# Patient Record
Sex: FEMALE | Race: WHITE
[De-identification: names, ages, dates, MRNs, and addresses within clinical notes are randomized per-mention and may not be internally consistent; named-entity substitution may affect disease eponyms.]

---

## 2017-12-06 ENCOUNTER — HOSPITAL ENCOUNTER (OUTPATIENT)
Dept: HOSPITAL 17 - HRAD | Age: 57
Discharge: HOME | End: 2017-12-06
Attending: INTERNAL MEDICINE
Payer: MEDICAID

## 2017-12-06 VITALS
DIASTOLIC BLOOD PRESSURE: 77 MMHG | OXYGEN SATURATION: 96 % | RESPIRATION RATE: 18 BRPM | HEART RATE: 67 BPM | SYSTOLIC BLOOD PRESSURE: 120 MMHG

## 2017-12-06 VITALS
HEART RATE: 70 BPM | OXYGEN SATURATION: 97 % | SYSTOLIC BLOOD PRESSURE: 135 MMHG | RESPIRATION RATE: 20 BRPM | DIASTOLIC BLOOD PRESSURE: 80 MMHG

## 2017-12-06 VITALS
RESPIRATION RATE: 18 BRPM | DIASTOLIC BLOOD PRESSURE: 86 MMHG | SYSTOLIC BLOOD PRESSURE: 123 MMHG | OXYGEN SATURATION: 96 % | HEART RATE: 71 BPM

## 2017-12-06 VITALS
TEMPERATURE: 99.5 F | RESPIRATION RATE: 20 BRPM | HEART RATE: 67 BPM | DIASTOLIC BLOOD PRESSURE: 89 MMHG | OXYGEN SATURATION: 98 % | SYSTOLIC BLOOD PRESSURE: 141 MMHG

## 2017-12-06 VITALS — HEIGHT: 70 IN | WEIGHT: 135.36 LBS | BODY MASS INDEX: 19.38 KG/M2

## 2017-12-06 VITALS
RESPIRATION RATE: 18 BRPM | OXYGEN SATURATION: 95 % | HEART RATE: 72 BPM | TEMPERATURE: 98.3 F | DIASTOLIC BLOOD PRESSURE: 77 MMHG | SYSTOLIC BLOOD PRESSURE: 116 MMHG

## 2017-12-06 VITALS
OXYGEN SATURATION: 97 % | RESPIRATION RATE: 20 BRPM | DIASTOLIC BLOOD PRESSURE: 88 MMHG | HEART RATE: 68 BPM | SYSTOLIC BLOOD PRESSURE: 130 MMHG

## 2017-12-06 DIAGNOSIS — D70.9: ICD-10-CM

## 2017-12-06 DIAGNOSIS — J44.9: ICD-10-CM

## 2017-12-06 DIAGNOSIS — C92.00: Primary | ICD-10-CM

## 2017-12-06 DIAGNOSIS — D53.9: ICD-10-CM

## 2017-12-06 LAB
APTT BLD: 28.8 SEC (ref 24.3–30.1)
BASOPHILS NFR BLD AUTO: 1 % (ref 0–2)
BONE MARROW PROCESSING: (no result)
EOSINOPHIL NFR BLD: 4 % (ref 0–4)
ERYTHROCYTE [DISTWIDTH] IN BLOOD BY AUTOMATED COUNT: 19.5 % (ref 11.6–17.2)
HCT VFR BLD CALC: 27.7 % (ref 35–46)
HEMO FLAGS: (no result)
INR PPP: 1.1 RATIO
IRON STAIN: (no result)
JENNER GIEMSA STAIN: (no result)
MCH RBC QN AUTO: 35.2 PG (ref 27–34)
MCHC RBC AUTO-ENTMCNC: 33.1 % (ref 32–36)
MCV RBC AUTO: 106.6 FL (ref 80–100)
NEUTS BAND # BLD MANUAL: 0.4 TH/MM3 (ref 1.8–7.7)
NEUTS BAND NFR BLD: 4 % (ref 0–6)
NEUTS SEG NFR BLD MANUAL: 15 % (ref 16–70)
OVALOCYTES BLD QL SMEAR: (no result)
PLAT MORPH BLD: NORMAL
PLATELET # BLD: 198 TH/MM3 (ref 150–450)
PLATELET BLD QL SMEAR: NORMAL
PROTHROMBIN TIME: 10.8 SEC (ref 9.8–11.6)
RBC # BLD AUTO: 2.6 MIL/MM3 (ref 4–5.3)
SCAN/DIFF: (no result)
WBC # BLD AUTO: 2 TH/MM3 (ref 4–11)
WBC DIFF SAMPLE: 100

## 2017-12-06 PROCEDURE — 77012 CT SCAN FOR NEEDLE BIOPSY: CPT

## 2017-12-06 PROCEDURE — 88311 DECALCIFY TISSUE: CPT

## 2017-12-06 PROCEDURE — 38221 DX BONE MARROW BIOPSIES: CPT

## 2017-12-06 PROCEDURE — 85610 PROTHROMBIN TIME: CPT

## 2017-12-06 PROCEDURE — C1830 POWER BONE MARROW BX NEEDLE: HCPCS

## 2017-12-06 PROCEDURE — 99153 MOD SED SAME PHYS/QHP EA: CPT

## 2017-12-06 PROCEDURE — G0364 BONE MARROW ASPIRATE &BIOPSY: HCPCS

## 2017-12-06 PROCEDURE — 85007 BL SMEAR W/DIFF WBC COUNT: CPT

## 2017-12-06 PROCEDURE — 88313 SPECIAL STAINS GROUP 2: CPT

## 2017-12-06 PROCEDURE — 88305 TISSUE EXAM BY PATHOLOGIST: CPT

## 2017-12-06 PROCEDURE — 99152 MOD SED SAME PHYS/QHP 5/>YRS: CPT

## 2017-12-06 PROCEDURE — 85097 BONE MARROW INTERPRETATION: CPT

## 2017-12-06 PROCEDURE — 85027 COMPLETE CBC AUTOMATED: CPT

## 2017-12-06 PROCEDURE — 85730 THROMBOPLASTIN TIME PARTIAL: CPT

## 2017-12-06 NOTE — RADRPT
EXAM DATE/TIME:  12/06/2017 14:22 

 

[facility] COMPARISON:     

No previous studies available for comparison.

 

 

INDICATIONS :      

Myelodysplastic syndrome, acute myeloid leukemia, neutropenia. 

                     

 

 

 

SEDATION TIME:       

40 minutes

 

 

BIOPSY SITE:       

Right ilium.

                     

 

MEDICATION(S):

1.) 3 mg midazolam (Versed) IV  

2.) 150 mcg fentanyl (Sublimaze) IV  

 

 

DEVICE(S):

1.) 11 gauge Bone marrow biopsy needle 

                     

 

MEDICAL HISTORY :     

Chronic obstructive pulmonary disease. Renal calculi. Stroke Myelodysplastic syndrome. 

 

SURGICAL HISTORY :     

Hysterectomy.   

 

ENCOUNTER:     

Initial

 

ACUITY:     

1 day

 

PAIN SCORE:     

0/10

 

LOCATION:     

Right pelvis

                     

A total of one core specimen(s) were obtained and sent to the laboratory for pathologic evaluation.

 

 

PROCEDURE:

1.   CT guided bone marrow biopsy.

 

 

Prior to the procedure informed consent was obtained.  Any appropriate prior imaging studies were rev
iewed.  Using automated exposure control and adjustment of the mA and/or kV according to patient size
, radiation dose was kept as low as reasonably achievable to obtain optimal diagnostic quality images
.  DICOM format image data is available electronically for review and comparison.  

 

The site was prepped in a sterile fashion.  Full sterile technique was used, including cap, mask, hira
rile gloves and gown and a large sterile sheet.  Hand hygiene and 2% chlorhexidine and/or betadine/al
cohol prep was utilized per protocol for cutaneous antisepsis.  The skin and subcutaneous tissues wer
e infiltrated with local anesthetic solution.

 

With CT guidance the previously identified target was localized. Biopsy was performed using the presc
ribed needle as above.  Following biopsy marrow aspiration was performed with repeat puncture. Adequa
te hemostasis was obtained with compression at the puncture site.

 

Follow-up CT scan reveals no hemorrhage.

 

Conscious sedation was performed with the prescribed dosages and duration as above in the presence of
 an independent trained radiology nurse to assist in the monitoring of the patient.  EKG and oximetry
 remained stable throughout the procedure. The patient tolerated the procedure well and there were no
 complications.  The patient was sent to Radiology Outpatient Unit in stable condition.

 

CONCLUSION:     

1.  Uncomplicated CT guided bone marrow aspirate.

2.  Uncomplicated CT guided bone marrow biopsy.

 

 

 

 Dusty Whittington MD on December 06, 2017 at 15:55           

Board Certified Radiologist.

 This report was verified electronically.

## 2017-12-06 NOTE — PD.RAD
Post CT Procedure Prog Note


Pre Procedure Diagnosis:  


(1) Anemia


(2) AML (acute myeloblastic leukemia)


Post Procedure Diagnosis:  


(1) AML (acute myeloblastic leukemia)


(2) Anemia


Procedure Date:


Dec 6, 2017


Supervising Radiologist:


Dusty Whittington


Anesthesia:  Local, Analgesia, Conscious Sedation


Plan of Activity


Patient to Unit:  [facility]


Patient Condition:  Good


See PACS Report for procedural detail/treatment





Biopsy


Imaging Guidance:  CT


Side:  Right


Biopsy Procedure:  Bone Marrow


Specimen:  Core Biopsy, Fine Needle Aspirate


Fluid Description:  Dusty Singh MD Dec 6, 2017 14:55

## 2018-04-12 ENCOUNTER — HOSPITAL ENCOUNTER (OUTPATIENT)
Dept: HOSPITAL 17 - PHED | Age: 58
Setting detail: OBSERVATION
LOS: 1 days | Discharge: HOME | End: 2018-04-13
Attending: HOSPITALIST | Admitting: HOSPITALIST
Payer: MEDICAID

## 2018-04-12 VITALS
OXYGEN SATURATION: 93 % | HEART RATE: 72 BPM | SYSTOLIC BLOOD PRESSURE: 89 MMHG | RESPIRATION RATE: 16 BRPM | DIASTOLIC BLOOD PRESSURE: 59 MMHG

## 2018-04-12 VITALS
DIASTOLIC BLOOD PRESSURE: 60 MMHG | SYSTOLIC BLOOD PRESSURE: 109 MMHG | OXYGEN SATURATION: 97 % | RESPIRATION RATE: 16 BRPM | HEART RATE: 84 BPM | TEMPERATURE: 99.5 F

## 2018-04-12 VITALS
OXYGEN SATURATION: 96 % | HEART RATE: 77 BPM | TEMPERATURE: 98.9 F | SYSTOLIC BLOOD PRESSURE: 97 MMHG | DIASTOLIC BLOOD PRESSURE: 71 MMHG | RESPIRATION RATE: 12 BRPM

## 2018-04-12 VITALS
TEMPERATURE: 98.8 F | RESPIRATION RATE: 12 BRPM | SYSTOLIC BLOOD PRESSURE: 97 MMHG | OXYGEN SATURATION: 97 % | DIASTOLIC BLOOD PRESSURE: 78 MMHG | HEART RATE: 77 BPM

## 2018-04-12 VITALS — HEIGHT: 70 IN | BODY MASS INDEX: 19.73 KG/M2 | WEIGHT: 137.79 LBS

## 2018-04-12 VITALS
HEART RATE: 74 BPM | DIASTOLIC BLOOD PRESSURE: 66 MMHG | SYSTOLIC BLOOD PRESSURE: 91 MMHG | RESPIRATION RATE: 18 BRPM | OXYGEN SATURATION: 93 %

## 2018-04-12 VITALS — DIASTOLIC BLOOD PRESSURE: 51 MMHG | SYSTOLIC BLOOD PRESSURE: 97 MMHG

## 2018-04-12 DIAGNOSIS — I25.2: ICD-10-CM

## 2018-04-12 DIAGNOSIS — F41.9: ICD-10-CM

## 2018-04-12 DIAGNOSIS — G62.9: ICD-10-CM

## 2018-04-12 DIAGNOSIS — D70.9: ICD-10-CM

## 2018-04-12 DIAGNOSIS — J44.9: ICD-10-CM

## 2018-04-12 DIAGNOSIS — Z79.82: ICD-10-CM

## 2018-04-12 DIAGNOSIS — M19.90: ICD-10-CM

## 2018-04-12 DIAGNOSIS — I10: ICD-10-CM

## 2018-04-12 DIAGNOSIS — G89.29: ICD-10-CM

## 2018-04-12 DIAGNOSIS — Z86.73: ICD-10-CM

## 2018-04-12 DIAGNOSIS — D64.89: Primary | ICD-10-CM

## 2018-04-12 DIAGNOSIS — Z85.72: ICD-10-CM

## 2018-04-12 DIAGNOSIS — D46.9: ICD-10-CM

## 2018-04-12 DIAGNOSIS — R53.83: ICD-10-CM

## 2018-04-12 DIAGNOSIS — R06.02: ICD-10-CM

## 2018-04-12 DIAGNOSIS — Z79.899: ICD-10-CM

## 2018-04-12 DIAGNOSIS — F17.210: ICD-10-CM

## 2018-04-12 LAB
BUN SERPL-MCNC: 15 MG/DL (ref 7–18)
CALCIUM SERPL-MCNC: 8.2 MG/DL (ref 8.5–10.1)
CHLORIDE SERPL-SCNC: 111 MEQ/L (ref 98–107)
CREAT SERPL-MCNC: 0.77 MG/DL (ref 0.5–1)
ERYTHROCYTE [DISTWIDTH] IN BLOOD BY AUTOMATED COUNT: 30.9 % (ref 11.6–17.2)
GFR SERPLBLD BASED ON 1.73 SQ M-ARVRAT: 77 ML/MIN (ref 89–?)
GLUCOSE SERPL-MCNC: 87 MG/DL (ref 74–106)
HCO3 BLD-SCNC: 22.3 MEQ/L (ref 21–32)
HCT VFR BLD CALC: 15.8 % (ref 35–46)
HGB BLD-MCNC: 5.3 GM/DL (ref 11.6–15.3)
INR PPP: 1.2 RATIO
LYMPHOCYTES: 78 % (ref 9–44)
MCH RBC QN AUTO: 36 PG (ref 27–34)
MCHC RBC AUTO-ENTMCNC: 33.8 % (ref 32–36)
MCV RBC AUTO: 106.3 FL (ref 80–100)
MONOCYTES: 9 % (ref 0–8)
NEUTS BAND # BLD MANUAL: 0.2 TH/MM3 (ref 1.8–7.7)
NEUTS SEG NFR BLD MANUAL: 13 % (ref 16–70)
PLATELET # BLD: 132 TH/MM3 (ref 150–450)
PMV BLD AUTO: 8.4 FL (ref 7–11)
PROTHROMBIN TIME: 12.1 SEC (ref 9.8–11.6)
RBC # BLD AUTO: 1.48 MIL/MM3 (ref 4–5.3)
SODIUM SERPL-SCNC: 140 MEQ/L (ref 136–145)
TARGETS BLD QL SMEAR: (no result)
WBC # BLD AUTO: 1.8 TH/MM3 (ref 4–11)

## 2018-04-12 PROCEDURE — 86922 COMPATIBILITY TEST ANTIGLOB: CPT

## 2018-04-12 PROCEDURE — G0378 HOSPITAL OBSERVATION PER HR: HCPCS

## 2018-04-12 PROCEDURE — 96361 HYDRATE IV INFUSION ADD-ON: CPT

## 2018-04-12 PROCEDURE — 96360 HYDRATION IV INFUSION INIT: CPT

## 2018-04-12 PROCEDURE — 85007 BL SMEAR W/DIFF WBC COUNT: CPT

## 2018-04-12 PROCEDURE — 85610 PROTHROMBIN TIME: CPT

## 2018-04-12 PROCEDURE — 86901 BLOOD TYPING SEROLOGIC RH(D): CPT

## 2018-04-12 PROCEDURE — P9040 RBC LEUKOREDUCED IRRADIATED: HCPCS

## 2018-04-12 PROCEDURE — 99285 EMERGENCY DEPT VISIT HI MDM: CPT

## 2018-04-12 PROCEDURE — 86900 BLOOD TYPING SEROLOGIC ABO: CPT

## 2018-04-12 PROCEDURE — 86920 COMPATIBILITY TEST SPIN: CPT

## 2018-04-12 PROCEDURE — 85027 COMPLETE CBC AUTOMATED: CPT

## 2018-04-12 PROCEDURE — 80048 BASIC METABOLIC PNL TOTAL CA: CPT

## 2018-04-12 PROCEDURE — 36430 TRANSFUSION BLD/BLD COMPNT: CPT

## 2018-04-12 PROCEDURE — 86850 RBC ANTIBODY SCREEN: CPT

## 2018-04-12 PROCEDURE — 85730 THROMBOPLASTIN TIME PARTIAL: CPT

## 2018-04-12 RX ADMIN — STANDARDIZED SENNA CONCENTRATE AND DOCUSATE SODIUM SCH TAB: 8.6; 5 TABLET, FILM COATED ORAL at 21:43

## 2018-04-12 RX ADMIN — PREGABALIN SCH MG: 75 CAPSULE ORAL at 17:44

## 2018-04-12 RX ADMIN — Medication SCH ML: at 21:00

## 2018-04-12 NOTE — HHI.HP
__________________________________________________





HPI


Service


[facility]


Primary Care Physician


Charly Garcia MD


Admission Diagnosis





Symptomatic anemia


Diagnoses:  


(1) Anemia


Chief Complaint:  


Told to come here by oncology


Travel History


International Travel<30 Days:  No


Contact w/Intl Traveler <30 Da:  No


Traveled to Known Affected Are:  No


History of Present Illness


57-year-old rather unfortunate female with myelodysplasia, antiphospholipid 

syndrome, COPD, anxiety, history of CVA, peripheral neuropathy, history of 

myocardial infarction who presented to hospital at the request of her 

oncologist for transfusion.  Patient does have mild spastic syndrome and 

undergoes transfusions on a regular basis.  She states that has been getting 

worse since November with pancytopenia has been requiring Neupogen, frequent 

transfusions.  Her last transfusion was Friday where she got transfused 1 unit 

of packed red blood cells.  She had follow-up blood work done and was called by 

the oncologist office who told her to go to the ER to get a transfusion.  

Patient had blood work done today which did indicate a hemoglobin of 5.3.  

Patient is also neutropenic.  Patient indicates that she has had short of 

breath and increased fatigue.  She was seen by emergency room physician and 

requested observation for blood transfusion as requested by the oncologist.





Review of Systems


Constitutional:  COMPLAINS OF: Fatigue


Ears, nose, mouth, throat:  COMPLAINS OF: Throat pain, Sinus Pain


Respiratory:  COMPLAINS OF: Shortness of breath


Except as stated in HPI:  all other systems reviewed are Neg





Past Family Social History


Past Medical History


Myelodysplastic syndrome


History of myocardial infarction


History CVA


Peripheral neuropathy


Continue tobacco use


Chronic back pain


History of acute myelogenous leukemia


Anxiety


Chronic infarct pulmonary disease


Past Surgical History


Bone marrow biopsy


Right knee surgery


Ovarian cystotomy


Sinus surgery


Hysterectomy


Reported Medications





Reported Meds & Active Scripts


Active


Reported


Flonase Nasal Spray (Fluticasone Nasal Spray) 50 Mcg/Act Spray 50 Mcg EACH NARE 

BID


Percocet (Oxycodone-Acetaminophen)  mg Tab 1 Tab PO TID PRN


Fentanyl Patch 72 HR (Fentanyl) 25 Mcg/Hr Patch 25 Mcg T-DERMAL Q72H


Lyrica (Pregabalin) 150 Mg Cap 150 Mg PO TID


Multiple Vitamin 1 Tab 1 Tab PO DAILY


Plavix (Clopidogrel Bisulfate) 75 Mg Tab 75 Mg PO DAILY


D-2000 Maximum Strength (Cholecalciferol) 2,000 Unit Tab 2,000 Units PO DAILY


Atorvastatin (Atorvastatin Calcium) 10 Mg Tab 10 Mg PO HS


Aspirin Low Dose (Aspirin) 81 Mg Chew 81 Mg CHEW DAILY


Allergies:  


Coded Allergies:  


     aspirin (Unverified  Allergy, Severe, N/V,  BEHAIVIOR CHANGES, 18)


 pt states does not have a allergy to this med HO Hathaway


 18


     butalbital (Unverified  Allergy, Severe, N/V,  BEHAIVIOR CHANGES, 18)


     butorphanol (Unverified  Allergy, Severe, IRRITABLE, 18)


     caffeine (Unverified  Allergy, Severe, N/V,  BEHAIVIOR CHANGES, 18)


     acetaminophen (Unverified  Allergy, Unknown, 18)


     codeine (Unverified  Allergy, Unknown, 18)


Family History


Reviewed and significant for father  in his 60s from heart disease, 

mother  at age 72 from pancreatic cancer, mother  at age 57 

from lung cancer


Social History


Patient continues to smoke at least one cigarette weekly.  She was smoking half 

pack of cigarettes a day since she was 14 years old.  Patient denies any 

alcohol or illicit drug





Physical Exam


Vital Signs





Vital Signs








  Date Time  Temp Pulse Resp B/P (MAP) Pulse Ox O2 Delivery O2 Flow Rate FiO2


 


18 15:57  74 18 91/66 (74) 93 Room Air  


 


18 15:57   18  93 Room Air  


 


18 15:04 99.5 84 16 109/60 (76) 97   








Physical Exam


GENERAL: Well-developed, cachectic, in no acute distress. alert and orientated


HEENT: Head is normocephalic without any lesions or masses noted. Facial 

features are symmetric. Eyes: Pupils equal round reactive to light. Extraocular 

muscles are intact. Conjunctivae were clear.  Did not examine oropharyngeal 

patient is wearing mass due to neutropenia


NECK: Supple without any masses. Trachea midline no deviation. No JVD, no 

bruits are appreciated


CARDIAC: Regular rhythm, regular rate. S1/S2 are heard. No murmurs gallops or 

rubs.


LUNGS: Clear to auscultation bilaterally. No wheeze, rhonchi or rales. No use 

of accessory muscles on inspiration or expiration.


ABDOMEN: Soft, nontender. Nondistended. Bowel sounds heard in all 4 quadrants. 

No organomegaly or masses. Negative rebound, negative guarding


EXTREMITIES: No edema, pulses are equal bilaterally. No cyanosis or clubbing


NEUROLOGY: Mood and affect appear appropriate. Cranial nerves II through XII 

grossly intact.  Patient has obvious muscle wasting of the right hand


Laboratory





Laboratory Tests








Test


  18


15:30


 


White Blood Count 1.8 


 


Red Blood Count 1.48 


 


Hemoglobin 5.3 


 


Hematocrit 15.8 


 


Mean Corpuscular Volume 106.3 


 


Mean Corpuscular Hemoglobin 36.0 


 


Mean Corpuscular Hemoglobin


Concent 33.8 


 


 


Red Cell Distribution Width 30.9 


 


Platelet Count 132 


 


Mean Platelet Volume 8.4 


 


CBC Comment AUTO DIFF 


 


Differential Total Cells


Counted 100 


 


 


Neutrophils % (Manual) 13 


 


Lymphocytes % 78 


 


Monocytes % 9 


 


Neutrophils # (Manual) 0.2 


 


Differential Comment


  FINAL DIFF


MANUAL


 


Platelet Estimate LOW 


 


Platelet Morphology Comment NORMAL 


 


Target Cells 1+ 


 


Prothrombin Time 12.1 


 


Prothromb Time International


Ratio 1.2 


 


 


Activated Partial


Thromboplast Time 32.2 


 


 


Blood Urea Nitrogen 15 


 


Creatinine 0.77 


 


Random Glucose 87 


 


Calcium Level 8.2 


 


Sodium Level 140 


 


Potassium Level 3.9 


 


Chloride Level 111 


 


Carbon Dioxide Level 22.3 


 


Anion Gap 7 


 


Estimat Glomerular Filtration


Rate 77 


 








Result Diagram:  


18 1530                                                                   

             18 1530








Caprini VTE Risk Assessment


Caprini VTE Risk Assessment:  Mod/High Risk (score >= 2)


Caprini Risk Assessment Model











 Point Value = 1          Point Value = 2  Point Value = 3  Point Value = 5


 


Age 41-60


Minor surgery


BMI > 25 kg/m2


Swollen legs


Varicose veins


Pregnancy or postpartum


History of unexplained or recurrent


   spontaneous 


Oral contraceptives or hormone


   replacement


Sepsis (< 1 month)


Serious lung disease, including


   pneumonia (< 1 month)


Abnormal pulmonary function


Acute myocardial infarction


Congestive heart failure (< 1 month)


History of inflammatory bowel disease


Medical patient at bed rest Age 61-74


Arthroscopic surgery


Major open surgery (> 45 min)


Laparoscopic surgery (> 45 min)


Malignancy


Confined to bed (> 72 hours)


Immobilizing plaster cast


Central venous access Age >= 75


History of VTE


Family history of VTE


Factor V Leiden


Prothrombin 28049P


Lupus anticoagulant


Anticardiolipin antibodies


Elevated serum homocysteine


Heparin-induced thrombocytopenia


Other congenital or acquired


   thrombophilia Stroke (< 1 month)


Elective arthroplasty


Hip, pelvis, or leg fracture


Acute spinal cord injury (< 1 month)








Prophylaxis Regimen











   Total Risk


Factor Score Risk Level Prophylaxis Regimen


 


0-1      Low Early ambulation


 


2 Moderate Order ONE of the following:


*Sequential Compression Device (SCD)


*Heparin 5000 units SQ BID


 


3-4 Higher Order ONE of the following medications:


*Heparin 5000 units SQ TID


*Enoxaparin/Lovenox 40 mg SQ daily (WT < 150 kg, CrCl > 30 mL/min)


*Enoxaparin/Lovenox 30 mg SQ daily (WT < 150 kg, CrCl > 10-29 mL/min)


*Enoxaparin/Lovenox 30 mg SQ BID (WT < 150 kg, CrCl > 30 mL/min)


AND/OR


*Sequential Compression Device (SCD)


 


5 or more Highest Order ONE of the following medications:


*Heparin 5000 units SQ TID (Preferred with Epidurals)


*Enoxaparin/Lovenox 40 mg SQ daily (WT < 150 kg, CrCl > 30 mL/min)


*Enoxaparin/Lovenox 30 mg SQ daily (WT < 150 kg, CrCl > 10-29 mL/min)


*Enoxaparin/Lovenox 30 mg SQ BID (WT < 150 kg, CrCl > 30 mL/min)


AND


*Sequential Compression Device (SCD)











Assessment and Plan


Assessment and Plan


Anemia secondary to myelodysplastic syndrome requiring transfusion


   ER physician transfusing 2 units of packed red blood cells


   Follow-up hemoglobin/hematocrit, try to keep hemoglobin above 8.0





Neutropenia secondary to myelodysplastic syndrome


   Neutropenic precautions





History of CVA, history of myocardial infarction,


   Continue home medications





Chronic pain with peripheral neuropathy


   Continue home medications





DVT prevention


   Sequential compression devices





Problem Qualifiers





(1) Anemia:  


Qualified Codes:  D64.89 - Other specified anemias








Loi Card 2018 16:53

## 2018-04-12 NOTE — PD
HPI


Chief Complaint:  Abnormal Results


Time Seen by Provider:  15:13


Travel History


International Travel<30 days:  No


Contact w/Intl Traveler<30days:  No


Traveled to known affect area:  No





History of Present Illness


HPI


Patient is a 57 year old female with history of mild dysplastic syndrome with 

profound neutropenia, presents the emergency room with complaints of anemia.  

Patient reports that she does follow-up with Dr. Jose Pathak with hematology.  

Patient reports that she had her blood drawn last week and her hemoglobin was 

low, she subsequently had a blood transfusion on Friday.  Patient had her blood 

work redrawn yesterday, her hemoglobin resulted as 5.7.  Patient was told by 

hematologist to go directly to the emergency room for a blood transfusion.  

Patient reports that she does feel a little weaker than normal, denies any 

fevers or chills, denies any chest pain or shortness of breath.  Patient with 

no other complaints at this time.





PFSH


Past Medical History


Hx Anticoagulant Therapy:  Yes (plavix and asa 81mg)


Anemia:  Yes


Arthritis:  Yes


Asthma:  No


Anxiety:  No


Depression:  No


Cancer:  Yes (LEUKEMIA/LYMPHOMA)


Cardiovascular Problems:  Yes (MI x 1 with one stent, hx of htn not on meds)


High Cholesterol:  No


Chemotherapy:  Yes (last 2014)


Chest Pain:  No


Congestive Heart Failure:  No


COPD:  No


Diabetes:  No


Diminished Hearing:  No


Gastrointestinal Disorders:  Yes (PUD)


Glaucoma:  No


Genitourinary:  No


Headaches:  Yes (MIGRAINES)


Hepatitis:  No


Hiatal Hernia:  No


Hypertension:  Yes (WITH HEADACHES)


Immune Disorder:  No


Musculoskeletal:  Yes (CHRONIC BACK PAIN)


Neurologic:  Yes (migraines , cva)


Psychiatric:  No


Reproductive:  No


Respiratory:  No


Immunizations Current:  No


Migraines:  Yes


Sleep Apnea:  No


Thyroid Disease:  No


Pregnant?:  Not Pregnant


Menopausal:  Yes


:  3


Para:  3


Ovarian Cysts:  Yes





Past Surgical History


AICD:  No


Gynecologic Surgery:  Yes (LAPAROSCOPY, OVARIAN CYSTECTOMY)


Hysterectomy:  Yes


Joint Replacement:  No


Pacemaker:  No


Other Surgery:  Yes (SINUS SURGERY )





Social History


Alcohol Use:  No


Tobacco Use:  Yes (OCCASIONAL)


Substance Use:  No





Allergies-Medications


(Allergen,Severity, Reaction):  


Coded Allergies:  


     aspirin (Unverified  Allergy, Severe, N/V,  BEHAIVIOR CHANGES, 18)


 pt states does not have a allergy to this med HO Hathaway


 18


     butalbital (Unverified  Allergy, Severe, N/V,  BEHAIVIOR CHANGES, 18)


     butorphanol (Unverified  Allergy, Severe, IRRITABLE, 18)


     caffeine (Unverified  Allergy, Severe, N/V,  BEHAIVIOR CHANGES, 18)


     acetaminophen (Unverified  Allergy, Unknown, 18)


     codeine (Unverified  Allergy, Unknown, 18)


Reported Meds & Prescriptions





Reported Meds & Active Scripts


Active


Reported


Flonase Nasal Spray (Fluticasone Nasal Spray) 50 Mcg/Act Spray 50 Mcg EACH NARE 

BID


Percocet (Oxycodone-Acetaminophen)  mg Tab 1 Tab PO TID PRN


Fentanyl Patch 72 HR (Fentanyl) 25 Mcg/Hr Patch 25 Mcg T-DERMAL Q72H


Lyrica (Pregabalin) 150 Mg Cap 150 Mg PO TID


Multiple Vitamin 1 Tab 1 Tab PO DAILY


Plavix (Clopidogrel Bisulfate) 75 Mg Tab 75 Mg PO DAILY


D-2000 Maximum Strength (Cholecalciferol) 2,000 Unit Tab 2,000 Units PO DAILY


Atorvastatin (Atorvastatin Calcium) 10 Mg Tab 10 Mg PO HS


Aspirin Low Dose (Aspirin) 81 Mg Chew 81 Mg CHEW DAILY








Review of Systems


General / Constitutional:  No: Fever


Eyes:  No: Visual changes


HENT:  No: Headaches


Cardiovascular:  No: Chest Pain or Discomfort


Respiratory:  No: Shortness of Breath


Gastrointestinal:  No: Abdominal Pain


Genitourinary:  No: Dysuria


Musculoskeletal:  No: Pain


Skin:  No Rash


Neurologic:  Positive: Weakness


Psychiatric:  No: Depression


Endocrine:  No: Polydipsia


Hematologic/Lymphatic:  No: Easy Bruising





Physical Exam


Narrative


GENERAL: Mild distress


SKIN: Focused skin assessment warm/dry/pale


HEAD: Atraumatic. Normocephalic. 


EYES: Pupils equal and round. No scleral icterus. No injection or drainage. 


ENT: No nasal bleeding or discharge.  Mucous membranes pink and moist.


NECK: Trachea midline. No JVD. 


CARDIOVASCULAR: Regular rate and rhythm.  No murmur appreciated.


RESPIRATORY: No accessory muscle use. Clear to auscultation. Breath sounds 

equal bilaterally. 


GASTROINTESTINAL: Abdomen soft, non-tender, nondistended. Hepatic and splenic 

margins not palpable. 


MUSCULOSKELETAL: No obvious deformities. No clubbing.  No cyanosis.  No edema. 


NEUROLOGICAL: Awake and alert. No obvious cranial nerve deficits.  Motor 

grossly within normal limits. Normal speech.


PSYCHIATRIC: Appropriate mood and affect; insight and judgment normal.





Data


Data


Last Documented VS





Vital Signs








  Date Time  Temp Pulse Resp B/P (MAP) Pulse Ox O2 Delivery O2 Flow Rate FiO2


 


18 15:57  74 18 91/66 (74) 93 Room Air  


 


18 15:04 99.5       








Orders





 Orders


Type And Screen (18 15:21)


Basic Metabolic Panel (Bmp) (18 15:21)


Complete Blood Count With Diff (18 15:21)


Prothrombin Time / Inr (Pt) (18 15:21)


Act Partial Throm Time (Ptt) (18 15:21)


Ecg Monitoring (18 15:21)


Iv Access Insert/Monitor (18 15:21)


Oximetry (18 15:21)


Sodium Chloride 0.9% Flush (Ns Flush) (18 15:30)


Sodium Chlorid 0.9% 500 Ml Inj (Ns 500 M (18 15:30)


Red Blood Cells Irradiated (18 16:12)


Blood Product Administration (18 16:12)


Sodium Chlor 0.9% 250 Ml Inj (Ns 250 Ml (18 16:15)


Place In Observation (18 )


Vital Signs (Adult) BAN.Q4H (18 16:23)


Activity Oob With Assistance (18 16:23)


Diet Regular Basic (18 Dinner)





Labs





Laboratory Tests








Test


  18


15:30


 


White Blood Count 1.8 TH/MM3 


 


Red Blood Count 1.48 MIL/MM3 


 


Hemoglobin 5.3 GM/DL 


 


Hematocrit 15.8 % 


 


Mean Corpuscular Volume 106.3 FL 


 


Mean Corpuscular Hemoglobin 36.0 PG 


 


Mean Corpuscular Hemoglobin


Concent 33.8 % 


 


 


Red Cell Distribution Width 30.9 % 


 


Platelet Count 132 TH/MM3 


 


Mean Platelet Volume 8.4 FL 


 


CBC Comment AUTO DIFF 


 


Prothrombin Time 12.1 SEC 


 


Prothromb Time International


Ratio 1.2 RATIO 


 


 


Activated Partial


Thromboplast Time 32.2 SEC 


 


 


Blood Urea Nitrogen 15 MG/DL 


 


Creatinine 0.77 MG/DL 


 


Random Glucose 87 MG/DL 


 


Calcium Level 8.2 MG/DL 


 


Sodium Level 140 MEQ/L 


 


Potassium Level 3.9 MEQ/L 


 


Chloride Level 111 MEQ/L 


 


Carbon Dioxide Level 22.3 MEQ/L 


 


Anion Gap 7 MEQ/L 


 


Estimat Glomerular Filtration


Rate 77 ML/MIN 


 











MDM


Medical Decision Making


Medical Screen Exam Complete:  Yes


Emergency Medical Condition:  Yes


Medical Record Reviewed:  Yes


Interpretation(s)





Vital Signs








  Date Time  Temp Pulse Resp B/P (MAP) Pulse Ox O2 Delivery O2 Flow Rate FiO2


 


18 15:04 99.5 84 16 109/60 (76) 97   








Differential Diagnosis


anemia, MDS


Narrative Course


57-year-old female with history of MDS and neutropenia who presents the 

emergency room for evaluation of anemia; yesterday's HGB resulted at 5.7 - 

patient here for blood transfusion





During the course of the patients emergency department visit, the patients 

history, examination, and differential diagnosis were reviewed with the 

patient. The patient was placed on a cardiac monitor with oximetry and frequent 

blood pressure monitoring. The patient had an IV access obtained and blood work 

sent for analysis. 





The patient was initially provided IVF





The patients laboratory studies were reviewed and remarkable for 





CBC & BMP Diagram


18 15:30








Calcium Level 8.2 L





. 





Patient's hemoglobin is 5.3, 2 units of blood was ordered.  Case reviewed with 

Dr. Beck who accepted patient to service.





Diagnosis





 Primary Impression:  


 Symptomatic anemia


 Additional Impression:  


 Neutropenia





Admitting Information


Admitting Physician Requests:  Admit











Becca Santamaria DO 2018 15:44

## 2018-04-13 VITALS
DIASTOLIC BLOOD PRESSURE: 93 MMHG | HEART RATE: 65 BPM | TEMPERATURE: 99.5 F | RESPIRATION RATE: 12 BRPM | OXYGEN SATURATION: 95 % | SYSTOLIC BLOOD PRESSURE: 133 MMHG

## 2018-04-13 VITALS — TEMPERATURE: 98.9 F

## 2018-04-13 VITALS
RESPIRATION RATE: 14 BRPM | OXYGEN SATURATION: 97 % | HEART RATE: 66 BPM | DIASTOLIC BLOOD PRESSURE: 72 MMHG | TEMPERATURE: 99.2 F | SYSTOLIC BLOOD PRESSURE: 102 MMHG

## 2018-04-13 VITALS
TEMPERATURE: 99.3 F | OXYGEN SATURATION: 97 % | RESPIRATION RATE: 20 BRPM | DIASTOLIC BLOOD PRESSURE: 73 MMHG | SYSTOLIC BLOOD PRESSURE: 118 MMHG | HEART RATE: 78 BPM

## 2018-04-13 VITALS
TEMPERATURE: 99.8 F | HEART RATE: 71 BPM | DIASTOLIC BLOOD PRESSURE: 86 MMHG | SYSTOLIC BLOOD PRESSURE: 141 MMHG | OXYGEN SATURATION: 95 % | RESPIRATION RATE: 16 BRPM

## 2018-04-13 VITALS — RESPIRATION RATE: 20 BRPM

## 2018-04-13 VITALS
DIASTOLIC BLOOD PRESSURE: 68 MMHG | OXYGEN SATURATION: 99 % | RESPIRATION RATE: 16 BRPM | TEMPERATURE: 99 F | HEART RATE: 74 BPM | SYSTOLIC BLOOD PRESSURE: 92 MMHG

## 2018-04-13 LAB
BASOPHILS # BLD AUTO: 0 TH/MM3 (ref 0–0.2)
BASOPHILS NFR BLD: 0.7 % (ref 0–2)
EOSINOPHIL # BLD: 0.1 TH/MM3 (ref 0–0.4)
EOSINOPHIL NFR BLD: 9.2 % (ref 0–4)
ERYTHROCYTE [DISTWIDTH] IN BLOOD BY AUTOMATED COUNT: 28.5 % (ref 11.6–17.2)
HCT VFR BLD CALC: 22.7 % (ref 35–46)
HGB BLD-MCNC: 7.6 GM/DL (ref 11.6–15.3)
LYMPHOCYTES # BLD AUTO: 1.1 TH/MM3 (ref 1–4.8)
LYMPHOCYTES NFR BLD AUTO: 61.9 % (ref 9–44)
LYMPHOCYTES: 66 % (ref 9–44)
MCH RBC QN AUTO: 31.8 PG (ref 27–34)
MCHC RBC AUTO-ENTMCNC: 33.5 % (ref 32–36)
MCV RBC AUTO: 95.1 FL (ref 80–100)
MONOCYTE #: 0.2 TH/MM3 (ref 0–0.9)
MONOCYTES NFR BLD: 13.2 % (ref 0–8)
MONOCYTES: 9 % (ref 0–8)
NEUTROPHILS # BLD AUTO: 0.2 TH/MM3 (ref 1.8–7.7)
NEUTROPHILS NFR BLD AUTO: 15 % (ref 16–70)
NEUTS BAND # BLD MANUAL: 0.3 TH/MM3 (ref 1.8–7.7)
NEUTS SEG NFR BLD MANUAL: 18 % (ref 16–70)
PLATELET # BLD: 121 TH/MM3 (ref 150–450)
PMV BLD AUTO: 9 FL (ref 7–11)
RBC # BLD AUTO: 2.38 MIL/MM3 (ref 4–5.3)
TARGETS BLD QL SMEAR: (no result)
WBC # BLD AUTO: 1.6 TH/MM3 (ref 4–11)

## 2018-04-13 RX ADMIN — PREGABALIN SCH MG: 75 CAPSULE ORAL at 08:06

## 2018-04-13 RX ADMIN — Medication SCH ML: at 08:06

## 2018-04-13 RX ADMIN — STANDARDIZED SENNA CONCENTRATE AND DOCUSATE SODIUM SCH TAB: 8.6; 5 TABLET, FILM COATED ORAL at 08:06

## 2018-04-13 NOTE — HHI.PR
Subjective


Remarks


Patient seen and examined today for follow-up on anemia secondary to 

myelodysplasia.  Patient laying in bed comfortable, denies any new complaints.  

Patient eager to go home.  Vital signs are stable, patient remains afebrile





Objective


Vitals





Vital Signs








  Date Time  Temp Pulse Resp B/P (MAP) Pulse Ox O2 Delivery O2 Flow Rate FiO2


 


4/13/18 08:00 99.8 71 16 141/86 (104) 95   


 


4/13/18 04:14 99.5 65 12 133/93 95   


 


4/13/18 04:00 98.9       


 


4/13/18 01:34 99.2 66 14 102/72 97   


 


4/13/18 00:50 99.0 74 16 92/68 99   


 


4/13/18 00:00 99.3 78 20 118/73 (88) 97   


 


4/12/18 23:00 98.8 77 12 97/78 97   


 


4/12/18 22:36    97/51 (66)    


 


4/12/18 22:34 98.9 77 12 97/71 96   


 


4/12/18 19:49        


 


4/12/18 18:43   16     


 


4/12/18 17:47  72 16 89/59 (69) 93 Room Air  


 


4/12/18 15:57  74 18 91/66 (74) 93 Room Air  


 


4/12/18 15:57   18  93 Room Air  


 


4/12/18 15:04 99.5 84 16 109/60 (76) 97   














I/O      


 


 4/12/18 4/12/18 4/12/18 4/13/18 4/13/18 4/13/18





 07:00 15:00 23:00 07:00 15:00 23:00


 


Intake Total   1000 ml 1090 ml  


 


Balance   1000 ml 1090 ml  


 


      


 


Intake Oral   500 ml 60 ml  


 


IV Total   500 ml   


 


Packed Cells    800 ml  


 


Blood Product IV Normal Saline Flush    230 ml  


 


# Voids   1 2  


 


# Bowel Movements   0 0  








Result Diagram:  


4/13/18 0525                                                                   

             4/12/18 1530





Objective Remarks


GENERAL: Well-developed, cachectic, in no acute distress. alert and orientated


HEENT: Head is normocephalic without any lesions or masses noted. Facial 

features are symmetric. Eyes: Extraocular muscles are intact. Conjunctivae were 

clear.


NECK: Supple without any masses. Trachea midline no deviation. No JVD,


CARDIAC: Regular rhythm, regular rate. S1/S2 are heard. No murmurs gallops or 

rubs.


LUNGS: Clear to auscultation bilaterally. No wheeze, rhonchi or rales. No use 

of accessory muscles on inspiration or expiration.


ABDOMEN: Soft, nontender. Nondistended. Bowel sounds heard in all 4 quadrants. 

No organomegaly or masses. Negative rebound, negative guarding


EXTREMITIES: No edema, pulses are equal bilaterally. No cyanosis or clubbing


NEUROLOGY: Mood and affect appear appropriate. Cranial nerves II through XII 

grossly intact.  Patient has obvious muscle wasting of the right hand, moving 

all extremities, speech is clear





A/P


Assessment and Plan


Anemia secondary to myelodysplastic syndrome requiring transfusion


   ER physician transfusing 2 units of packed red blood cells


   Follow-up hemoglobin/hematocrit, try to keep hemoglobin above 8.0


   Discussed with hematology/oncology, who indicated the patient can be 

discharged 





Neutropenia secondary to myelodysplastic syndrome


   Neutropenic precautions





History of CVA, history of myocardial infarction,


   Continue home medications





Chronic pain with peripheral neuropathy


   Continue home medications





DVT prevention


   Sequential compression devices


Discharge Planning


Discharge home in stable condition





Activity: Ad jamey.





Diet: Healthy heart diet





Medication per medication reconciliation





Follow-up with primary medical doctor in 1 week











Loi Card Apr 13, 2018 08:29

## 2018-04-13 NOTE — HHI.DCPOC
Discharge Care Plan


Diagnosis:  


(1) Symptomatic anemia


(2) Neutropenia


(3) MDS (myelodysplastic syndrome)


Goals to Promote Your Health


* To prevent worsening of your condition and complications


* To maintain your health at the optimal level


Directions to Meet Your Goals


*** Take your medications as prescribed


*** Follow your dietary instruction


*** Follow activity as directed








*** Keep your appointments as scheduled


*** Take your immunizations and boosters as scheduled


*** If your symptoms worsen call your PCP, if no PCP go to Urgent Care Center 

or Emergency Room***


*** Smoking is Dangerous to Your Health. Avoid second hand smoke***


***Call the 24-hour hour crisis hotline for domestic abuse at 1-374.630.4189***











Loi Card Apr 13, 2018 10:25

## 2018-05-01 ENCOUNTER — HOSPITAL ENCOUNTER (OUTPATIENT)
Dept: HOSPITAL 17 - HROP | Age: 58
Discharge: HOME | End: 2018-05-01
Attending: INTERNAL MEDICINE
Payer: MEDICAID

## 2018-05-01 VITALS
OXYGEN SATURATION: 96 % | RESPIRATION RATE: 20 BRPM | DIASTOLIC BLOOD PRESSURE: 78 MMHG | HEART RATE: 83 BPM | SYSTOLIC BLOOD PRESSURE: 118 MMHG

## 2018-05-01 VITALS
TEMPERATURE: 97.6 F | HEART RATE: 91 BPM | SYSTOLIC BLOOD PRESSURE: 139 MMHG | RESPIRATION RATE: 20 BRPM | OXYGEN SATURATION: 93 % | DIASTOLIC BLOOD PRESSURE: 93 MMHG

## 2018-05-01 VITALS
OXYGEN SATURATION: 94 % | RESPIRATION RATE: 20 BRPM | HEART RATE: 82 BPM | SYSTOLIC BLOOD PRESSURE: 106 MMHG | DIASTOLIC BLOOD PRESSURE: 73 MMHG

## 2018-05-01 VITALS
OXYGEN SATURATION: 98 % | RESPIRATION RATE: 20 BRPM | SYSTOLIC BLOOD PRESSURE: 116 MMHG | HEART RATE: 80 BPM | DIASTOLIC BLOOD PRESSURE: 68 MMHG

## 2018-05-01 VITALS
HEART RATE: 78 BPM | DIASTOLIC BLOOD PRESSURE: 71 MMHG | OXYGEN SATURATION: 94 % | RESPIRATION RATE: 20 BRPM | SYSTOLIC BLOOD PRESSURE: 104 MMHG

## 2018-05-01 VITALS
HEART RATE: 88 BPM | SYSTOLIC BLOOD PRESSURE: 115 MMHG | OXYGEN SATURATION: 95 % | TEMPERATURE: 97.5 F | RESPIRATION RATE: 18 BRPM | DIASTOLIC BLOOD PRESSURE: 76 MMHG

## 2018-05-01 VITALS — WEIGHT: 132.28 LBS | HEIGHT: 70 IN | BODY MASS INDEX: 18.94 KG/M2

## 2018-05-01 DIAGNOSIS — G62.9: ICD-10-CM

## 2018-05-01 DIAGNOSIS — F17.210: ICD-10-CM

## 2018-05-01 DIAGNOSIS — D46.9: Primary | ICD-10-CM

## 2018-05-01 DIAGNOSIS — F41.9: ICD-10-CM

## 2018-05-01 DIAGNOSIS — G89.29: ICD-10-CM

## 2018-05-01 DIAGNOSIS — Z86.73: ICD-10-CM

## 2018-05-01 LAB
INR PPP: 1.1 RATIO
PROTHROMBIN TIME: 10.7 SEC (ref 9.8–11.6)

## 2018-05-01 PROCEDURE — 85730 THROMBOPLASTIN TIME PARTIAL: CPT

## 2018-05-01 PROCEDURE — 99152 MOD SED SAME PHYS/QHP 5/>YRS: CPT

## 2018-05-01 PROCEDURE — 99153 MOD SED SAME PHYS/QHP EA: CPT

## 2018-05-01 PROCEDURE — 76937 US GUIDE VASCULAR ACCESS: CPT

## 2018-05-01 PROCEDURE — 85610 PROTHROMBIN TIME: CPT

## 2018-05-01 PROCEDURE — C1788 PORT, INDWELLING, IMP: HCPCS

## 2018-05-01 PROCEDURE — 77001 FLUOROGUIDE FOR VEIN DEVICE: CPT

## 2018-05-01 PROCEDURE — 36561 INSERT TUNNELED CV CATH: CPT

## 2018-05-01 NOTE — PD.RAD
Post Procedure Progress Note


Pre Procedure Diagnosis:  


(1) AML (acute myeloblastic leukemia)


Post Procedure Diagnosis:  


(1) AML (acute myeloblastic leukemia)


Procedure Date:


May 1, 2018


Supervising Radiologist:


Shankar Nicole


Estimated blood loss:


3cc


Anesthesia:  Local, Conscious Sedation


Plan of Activity


Patient to Unit:  [facility]


Patient Condition:  Fair


Additional Comments:


Port laced via the right IJ.


Catheter in good position OK for use.


See PACS Report for procedural detail/treatment











Shankar Nicole MD May 1, 2018 12:52

## 2018-05-03 NOTE — RADRPT
EXAM DATE/TIME:  05/01/2018 12:47 

 

[facility] COMPARISON:  

No previous studies available for comparison.

                     

 

INDICATIONS :               

Patient with myelodysplasia, antipholipid syndrome.Needs good  access.

                     

 

MEDICAL HISTORY :     

1. Myelodysplastic syndrome

2. MI

3. CVA

4. peripheral neuropathy

5. smoker

6. anxiety

7. chronic back pain

 

SURGICAL HISTORY :     

bone marrow bx

2. right knee surgery

3. ovarian cystotomy

4. sinus surgery

5. hysterectomy

 

ENCOUNTER:     

Initial

 

ACUITY:     

>1 year

 

PAIN SCORE:     

8/10

 

LOCATION:       

[facility]

                     

 

FLUORO TIME:     

0.4 minutes

 

IMAGE SERIES:      

1

 

SEDATION TIME:       

30 minutes

                     

 

ACCESS:     

Right internal jugular vein 

 

SEDATION:      

1.)  4 mg midazolam (Versed)  IV     

2.)  150 mcg fentanyl (Sublimaze)  IV     

      

Prophylactic antibiotics were administered with appropriate pre-procedure timing.     

Vancomycin within 2 hours of procedure, Ancef (or alternative) within 1 hour of procedure.     

      

 

DEVICE:      

1.  8 [place] single lumen    Xcela plus port     

 

 

PROCEDURE :     

1.  Continuous pulse oximetry and EKG monitoring.

2.  Intravenous conscious sedation.

3.  Ultrasound guidance for venous access.

4.  Fluoroscopic guided implantable central venous port placement.

 

The patient was placed supine. The neck was prepped in sterile fashion.  Full sterile technique was u
sed, including cap, mask, sterile gloves and gown, and a large sterile sheet.  Hand hygiene and 2% ch
lorhexidine Betadine was utilized per protocol for cutaneous antisepsis with appropriate dry time for
 site.  Sterile gel and sterile probe cover were utilized for ultrasound guidance.   The skin and sub
cutaneous tissues were infiltrated with local anesthetic solution.  

 

Under direct ultrasound guidance, central venous access was accomplished via the right internal jugul
ar vein.  The ultrasound images depicting access guidance were stored and saved to PACS for permanent
 record.  A subcutaneous pocket was created using blunt dissection.  The port was introduced to the p
ocket.  The catheter tubing was fed through a subcutaneous tunnel to the venotomy site.  The catheter
 tubing was cut to a suitable length and then was introduced through a valved Peel-Away sheath and po
sitioned with catheter tubing tip at the cavo-atrial junction level.  The pocket incision was closed 
with subcuticular Vicryl suture.  Steri-Strips were applied.  The port was flushed and locked with he
nav solution per protocol.  Sterile dressing was applied to the site.  The patient tolerated the pr
ocedure well.

 

Conscious sedation was performed with the prescribed dosages and duration as above in the presence of
 an independent trained radiology nurse to assist in the monitoring of the patient.  EKG and oximetry
 remained stable throughout the procedure. The patient tolerated the procedure well and there were no
 complications. The patient was sent to post anesthesia recovery in stable condition.

 

CONCLUSION:     

Uncomplicated ultrasound and fluoroscopic guided implanted central venous port catheter placement as 
described in detail above.  An 8 Moroccan Power port was placed.

 

 

 

 Shankar Nicole MD on May 03, 2018 at 8:23           

Board Certified Radiologist.

 This report was verified electronically.